# Patient Record
Sex: MALE | Race: BLACK OR AFRICAN AMERICAN | NOT HISPANIC OR LATINO | Employment: PART TIME | URBAN - METROPOLITAN AREA
[De-identification: names, ages, dates, MRNs, and addresses within clinical notes are randomized per-mention and may not be internally consistent; named-entity substitution may affect disease eponyms.]

---

## 2019-12-25 ENCOUNTER — HOSPITAL ENCOUNTER (EMERGENCY)
Facility: HOSPITAL | Age: 29
Discharge: HOME | End: 2019-12-25
Attending: EMERGENCY MEDICINE

## 2019-12-25 VITALS
BODY MASS INDEX: 34.07 KG/M2 | TEMPERATURE: 98 F | HEART RATE: 72 BPM | OXYGEN SATURATION: 99 % | SYSTOLIC BLOOD PRESSURE: 137 MMHG | RESPIRATION RATE: 18 BRPM | DIASTOLIC BLOOD PRESSURE: 82 MMHG | WEIGHT: 230 LBS | HEIGHT: 69 IN

## 2019-12-25 DIAGNOSIS — R09.89 ABNORMAL LUNG SOUNDS: ICD-10-CM

## 2019-12-25 DIAGNOSIS — J01.40 ACUTE NON-RECURRENT PANSINUSITIS: Primary | ICD-10-CM

## 2019-12-25 LAB
ANION GAP SERPL CALC-SCNC: 14 MMOL/L (ref 8–16)
BASOPHILS # BLD AUTO: 0.02 K/UL (ref 0–0.2)
BASOPHILS NFR BLD: 0.5 % (ref 0–1.9)
BUN SERPL-MCNC: 11 MG/DL (ref 6–30)
CHLORIDE SERPL-SCNC: 103 MMOL/L (ref 95–110)
CREAT SERPL-MCNC: 1 MG/DL (ref 0.5–1.4)
CTP QC/QA: YES
DIFFERENTIAL METHOD: ABNORMAL
EOSINOPHIL # BLD AUTO: 0.1 K/UL (ref 0–0.5)
EOSINOPHIL NFR BLD: 1.8 % (ref 0–8)
ERYTHROCYTE [DISTWIDTH] IN BLOOD BY AUTOMATED COUNT: 12.3 % (ref 11.5–14.5)
GLUCOSE SERPL-MCNC: 97 MG/DL (ref 70–110)
HCT VFR BLD AUTO: 46.7 % (ref 40–54)
HCT VFR BLD CALC: 47 %PCV (ref 36–54)
HGB BLD-MCNC: 15.1 G/DL (ref 14–18)
IMM GRANULOCYTES # BLD AUTO: 0.01 K/UL (ref 0–0.04)
IMM GRANULOCYTES NFR BLD AUTO: 0.3 % (ref 0–0.5)
LYMPHOCYTES # BLD AUTO: 2 K/UL (ref 1–4.8)
LYMPHOCYTES NFR BLD: 52.6 % (ref 18–48)
MCH RBC QN AUTO: 28.3 PG (ref 27–31)
MCHC RBC AUTO-ENTMCNC: 32.3 G/DL (ref 32–36)
MCV RBC AUTO: 88 FL (ref 82–98)
MONOCYTES # BLD AUTO: 0.3 K/UL (ref 0.3–1)
MONOCYTES NFR BLD: 8.7 % (ref 4–15)
NEUTROPHILS # BLD AUTO: 1.4 K/UL (ref 1.8–7.7)
NEUTROPHILS NFR BLD: 36.1 % (ref 38–73)
NRBC BLD-RTO: 0 /100 WBC
PLATELET # BLD AUTO: 198 K/UL (ref 150–350)
PMV BLD AUTO: 11.1 FL (ref 9.2–12.9)
POC IONIZED CALCIUM: 1.22 MMOL/L (ref 1.06–1.42)
POC MOLECULAR INFLUENZA A AGN: NEGATIVE
POC MOLECULAR INFLUENZA B AGN: NEGATIVE
POC TCO2 (MEASURED): 27 MMOL/L (ref 23–29)
POTASSIUM BLD-SCNC: 4.3 MMOL/L (ref 3.5–5.1)
RBC # BLD AUTO: 5.33 M/UL (ref 4.6–6.2)
SAMPLE: NORMAL
SODIUM BLD-SCNC: 139 MMOL/L (ref 136–145)
WBC # BLD AUTO: 3.8 K/UL (ref 3.9–12.7)

## 2019-12-25 PROCEDURE — 84132 ASSAY OF SERUM POTASSIUM: CPT

## 2019-12-25 PROCEDURE — 96361 HYDRATE IV INFUSION ADD-ON: CPT

## 2019-12-25 PROCEDURE — 82330 ASSAY OF CALCIUM: CPT

## 2019-12-25 PROCEDURE — 85014 HEMATOCRIT: CPT

## 2019-12-25 PROCEDURE — 85025 COMPLETE CBC W/AUTO DIFF WBC: CPT

## 2019-12-25 PROCEDURE — 84295 ASSAY OF SERUM SODIUM: CPT

## 2019-12-25 PROCEDURE — 87502 INFLUENZA DNA AMP PROBE: CPT

## 2019-12-25 PROCEDURE — 96374 THER/PROPH/DIAG INJ IV PUSH: CPT

## 2019-12-25 PROCEDURE — 99285 EMERGENCY DEPT VISIT HI MDM: CPT | Mod: 25

## 2019-12-25 PROCEDURE — 82565 ASSAY OF CREATININE: CPT

## 2019-12-25 PROCEDURE — 99900035 HC TECH TIME PER 15 MIN (STAT)

## 2019-12-25 PROCEDURE — 63600175 PHARM REV CODE 636 W HCPCS: Performed by: NURSE PRACTITIONER

## 2019-12-25 PROCEDURE — 25500020 PHARM REV CODE 255: Performed by: EMERGENCY MEDICINE

## 2019-12-25 RX ORDER — AMOXICILLIN AND CLAVULANATE POTASSIUM 400; 57 MG/5ML; MG/5ML
875 POWDER, FOR SUSPENSION ORAL 2 TIMES DAILY
Qty: 153 ML | Refills: 0 | Status: SHIPPED | OUTPATIENT
Start: 2019-12-25 | End: 2019-12-25 | Stop reason: ALTCHOICE

## 2019-12-25 RX ORDER — KETOROLAC TROMETHAMINE 30 MG/ML
15 INJECTION, SOLUTION INTRAMUSCULAR; INTRAVENOUS
Status: COMPLETED | OUTPATIENT
Start: 2019-12-25 | End: 2019-12-25

## 2019-12-25 RX ORDER — AMOXICILLIN AND CLAVULANATE POTASSIUM 875; 125 MG/1; MG/1
1 TABLET, FILM COATED ORAL 2 TIMES DAILY
Qty: 14 TABLET | Refills: 0 | Status: SHIPPED | OUTPATIENT
Start: 2019-12-25 | End: 2019-12-25 | Stop reason: ALTCHOICE

## 2019-12-25 RX ADMIN — SODIUM CHLORIDE 1000 ML: 0.9 INJECTION, SOLUTION INTRAVENOUS at 10:12

## 2019-12-25 RX ADMIN — KETOROLAC TROMETHAMINE 15 MG: 30 INJECTION, SOLUTION INTRAMUSCULAR at 10:12

## 2019-12-25 RX ADMIN — IOHEXOL 100 ML: 350 INJECTION, SOLUTION INTRAVENOUS at 11:12

## 2019-12-25 NOTE — DISCHARGE INSTRUCTIONS
Alternate Tylenol and advil every 3 hours for fever/body aches.       Flonase for congestion and runny nose.       Return to the Emergency department for any worsening or failure to improve, otherwise follow up with your primary care provider.

## 2019-12-25 NOTE — ED PROVIDER NOTES
"Encounter Date: 12/25/2019    SCRIBE #1 NOTE: I, Lindsay Romano, am scribing for, and in the presence of,  Anurag Ventura DNP. I have scribed the following portions of the note - Other sections scribed: HPI, ROS, PE.       History     Chief Complaint   Patient presents with    Dental Pain     x2 weeks of dental pain recently radiating to L ear with sore throat. some OTC meds taken with no relief in pain.      CC: Dental Pain    HPI:  This is a 29 y.o. male who presents to the Emergency Department with a cc of left sided dental pain beginning 3 weeks ago. The patient states "My pain has been worsening in the past week but it was the worst last night". He rates his pain 6/10. His pain is radiating to his left ear and head. The patient reports associated headache, sore throat, congestion, cough, sneezing, and fever of 98.9F. He states that his coughing and sneezing resolved this week. Denies chills, diarrhea, nausea, vomiting, or any other associated symptoms. The patient reports taking acetaminophen with no relief.      The history is provided by the patient.     Review of patient's allergies indicates:  No Known Allergies  History reviewed. No pertinent past medical history.  History reviewed. No pertinent surgical history.  History reviewed. No pertinent family history.  Social History     Tobacco Use    Smoking status: Never Smoker   Substance Use Topics    Alcohol use: Yes     Comment: occ    Drug use: No     Review of Systems   Constitutional: Positive for fever. Negative for chills.   HENT: Positive for congestion, dental problem (left sided dental pain), ear pain (left), sneezing and sore throat.    Eyes: Negative for visual disturbance.   Respiratory: Positive for cough. Negative for shortness of breath.    Cardiovascular: Negative for chest pain.   Gastrointestinal: Negative for abdominal pain, diarrhea, nausea and vomiting.   Genitourinary: Negative for dysuria.   Skin: Negative for rash. "   Allergic/Immunologic: Negative for immunocompromised state.   Neurological: Positive for headaches.       Physical Exam     Initial Vitals [12/25/19 0910]   BP Pulse Resp Temp SpO2   (!) 166/89 84 18 98.9 °F (37.2 °C) 97 %      MAP       --         Physical Exam    Nursing note and vitals reviewed.  Constitutional: He appears well-developed and well-nourished. He is not diaphoretic. No distress.   HENT:   Head: Normocephalic and atraumatic.   Right Ear: Hearing, tympanic membrane, external ear and ear canal normal.   Left Ear: Hearing, tympanic membrane, external ear and ear canal normal.   Nose: Nose normal. No mucosal edema or rhinorrhea. No epistaxis. Right sinus exhibits no maxillary sinus tenderness and no frontal sinus tenderness. Left sinus exhibits no maxillary sinus tenderness and no frontal sinus tenderness.   Mouth/Throat: Uvula is midline, oropharynx is clear and moist and mucous membranes are normal. No oral lesions. Normal dentition. No oropharyngeal exudate.   No tenderness or erythema to gums.    Eyes: Conjunctivae and EOM are normal. Pupils are equal, round, and reactive to light.   Neck: Normal range of motion. Neck supple.   Cardiovascular: Normal rate, regular rhythm, normal heart sounds and intact distal pulses.   Pulmonary/Chest: He has rales in the left upper field.   Abdominal: Soft. Normal appearance and bowel sounds are normal. There is no tenderness. There is no rebound and no guarding.   Musculoskeletal: Normal range of motion. He exhibits no edema or tenderness.   Neurological: He is alert and oriented to person, place, and time. He has normal strength. No cranial nerve deficit or sensory deficit. He exhibits normal muscle tone. He displays a negative Romberg sign. Coordination and gait normal. GCS score is 15. GCS eye subscore is 4. GCS verbal subscore is 5. GCS motor subscore is 6.   Equal  strength bilaterally, equal bicep flexion and tricep extension strength, leg extension and  flexion strength appropriate and equal, foot plantar- and dorsi-flexion equal and appropriate   Skin: Skin is warm and dry.   Psychiatric: He has a normal mood and affect. Thought content normal.         ED Course   Procedures  Labs Reviewed   CBC W/ AUTO DIFFERENTIAL - Abnormal; Notable for the following components:       Result Value    WBC 3.80 (*)     Gran # (ANC) 1.4 (*)     Gran% 36.1 (*)     Lymph% 52.6 (*)     All other components within normal limits   POCT INFLUENZA A/B MOLECULAR   ISTAT PROCEDURE          Imaging Results          CT Maxillofacial With Contrast (Final result)  Result time 12/25/19 11:35:17    Final result by Trace Marr MD (12/25/19 11:35:17)                 Impression:      No abscess or drainable fluid collections.  No evidence to suggest osteomyelitis.  Bilateral maxillary sinusitis, and other findings as outlined above      Electronically signed by: Trace Marr MD  Date:    12/25/2019  Time:    11:35             Narrative:    EXAMINATION:  CT MAXILLOFACIAL WITH CONTRAST    CLINICAL HISTORY:  Pain, maxface;with fever;    TECHNIQUE:  Maxillofacial CT was performed without IV contrast.  Sagittal and coronal reformats were obtained via post processing.    COMPARISON:  None    FINDINGS:  Visualized portions of the brain parenchyma appear normal.  Orbits are symmetric in appearance.  Bilateral chronic maxillary sinusitis changes are noted.  Mastoid air cells are normally pneumatized.  Skull is intact.  Visualized upper cervical spine appears normal.    Nasopharynx and oropharynx appear normal.  Epiglottis is unremarkable.  The parapharyngeal and retro pharyngeal fat planes are preserved.  Small scattered shotty bilateral cervical lymph nodes.  The visualized submandibular and parotid glands are within normal limits.  Visualized vessels of the neck remain grossly patent.  Note is made of periapical lucency surrounding posterior right upper molars.  No significant.  Occult lucency on the  left.  Please correlate with exam findings.  No abscess or drainable fluid collection.  No CT evidence suggesting osteomyelitis.  Deep to the BB marker on the left side of the face, no significant abnormality is seen.                               X-Ray Chest PA And Lateral (Final result)  Result time 12/25/19 10:08:57    Final result by Trace Marr MD (12/25/19 10:08:57)                 Impression:      No acute findings      Electronically signed by: Trace Marr MD  Date:    12/25/2019  Time:    10:08             Narrative:    EXAMINATION:  XR CHEST PA AND LATERAL    CLINICAL HISTORY:  Other specified symptoms and signs involving the circulatory and respiratory systems    TECHNIQUE:  PA and lateral views of the chest were performed.    COMPARISON:  None    FINDINGS:  Clear lungs.  No effusion.  Normal cardiomediastinal silhouette.  Intact bones.                                 Medical Decision Making:   Initial Assessment:   29-year-old male presents with dental pain to the left upper and lower mouth.  On physical exam there is no sinus tenderness, no nasal discharge, but erythema is present.  The gingiva is not reddened and there is no drainable collection of purulence.  Floor of the mouth is soft and supple. There is no adenopathy to suggest a causative etiology for the patient's discomfort.  Differential Diagnosis:   Sinusitis, pulpitis, dental caries, adenopathy, trigeminal neuralgia  ED Management:  Initial orders include CBC, i-STAT Chem 8, p.o. CT influenza, IV placement, CT maxillofacial with contrast, CT chest x-ray, Toradol 15 mg IV push, saline 1 L IV.            Scribe Attestation:   Scribe #1: I performed the above scribed service and the documentation accurately describes the services I performed. I attest to the accuracy of the note.            ED Course as of Dec 25 2005   Wed Dec 25, 2019   1038 Essentially normal istat chem8     ISTAT PROCEDURE [VC]   1038 POC Molecular Influenza A Ag:  Negative [VC]   1039 POC Molecular Influenza B Ag: Negative [VC]   1039 No acute findings   X-Ray Chest PA And Lateral [VC]   1044 CBC: leukocyte count was normal, the H&H was normal. The platelet count was normal.        CBC auto differential(!) [VC]   1141 No abscess or drainable fluid collections.  No evidence to suggest osteomyelitis.  Bilateral maxillary sinusitis, and other findings as outlined above   CT Maxillofacial With Contrast [VC]      ED Course User Index  [VC] Anurag Ventura DNP     Patient is discharged home in good condition to follow up with primary care.  Prescription for Augmentin is provided.  Patient should return for any worsening or changes in condition otherwise follow up as directed.  Symptomatic therapies and return precautions on AVS.   Medication choices were made after reviewing allergies, medications, history, available laboratories.            Clinical Impression:       ICD-10-CM ICD-9-CM   1. Acute non-recurrent pansinusitis J01.40 461.8   2. Abnormal lung sounds R09.89 786.7         Disposition:   Disposition: Discharged  Condition: Stable    Scribe attestation: SIMONE CRUZ DNP ACNP-BC FNP-C, personally performed the services described in this documentation. All medical record entries made by the scribe were at my direction and in my presence. I have reviewed the chart and agree that the record reflects my personal performance and is accurate and complete                     Anurag Ventura DNP  12/25/19 2005

## 2019-12-25 NOTE — ED TRIAGE NOTES
Pt c/o LEFT-sided dental pain x3 weeks. Denies fever, drainage, N/V. Also c/o LEFT ear pain, LEFT-sided throat pain. Pain is 5/10. Pt took acetaminophen last at 9:45 PM

## 2023-07-14 PROCEDURE — 99282 EMERGENCY DEPT VISIT SF MDM: CPT

## 2023-07-15 ENCOUNTER — HOSPITAL ENCOUNTER (EMERGENCY)
Facility: HOSPITAL | Age: 33
Discharge: HOME | End: 2023-07-15
Attending: EMERGENCY MEDICINE

## 2023-07-15 VITALS
HEART RATE: 71 BPM | OXYGEN SATURATION: 99 % | TEMPERATURE: 99 F | RESPIRATION RATE: 20 BRPM | DIASTOLIC BLOOD PRESSURE: 117 MMHG | SYSTOLIC BLOOD PRESSURE: 153 MMHG

## 2023-07-15 DIAGNOSIS — I10 HYPERTENSION, UNSPECIFIED TYPE: ICD-10-CM

## 2023-07-15 DIAGNOSIS — H61.20 IMPACTED CERUMEN, UNSPECIFIED LATERALITY: Primary | ICD-10-CM

## 2023-07-15 NOTE — ED NOTES
Patient arrived to the ED complaining of ear fullness, has been having sinus pressure for the last 3 days. Patient decided to come in to the ED because over the counter ear drops were not able to help with his ear fullness.Patient denies SOB or fever. Alert x 4. Will continue to monitor.

## 2023-07-15 NOTE — ED PROVIDER NOTES
Encounter Date: 7/14/2023       History     Chief Complaint   Patient presents with    Ear Fullness     Reports sinus pressure and ear muffledness x 3 days. Trying otc drops and this isn't helping. Sore throat yesterday but not today. No fever, cp, sob.     Jason Sandoval is a 32 y.o. male presenting to [facility] ED for ear fullness.  Patient states that he has been experiencing sinus pressure for the last 3 days.  States that he has a feeling of ear fullness and decreased hearing in his bilateral ears.  Tried out over-the-counter drops without significant relief.  Denies any pain in his ears.      Review of patient's allergies indicates:  No Known Allergies  History reviewed. No pertinent past medical history.  History reviewed. No pertinent surgical history.  History reviewed. No pertinent family history.  Social History     Tobacco Use    Smoking status: Never   Substance Use Topics    Alcohol use: Yes     Comment: occ    Drug use: No     Review of Systems   Constitutional:  Negative for fever.   HENT:  Positive for congestion and sore throat. Negative for ear pain and rhinorrhea.    Eyes:  Negative for visual disturbance.   Respiratory:  Negative for cough and shortness of breath.    Cardiovascular:  Negative for chest pain and leg swelling.   Gastrointestinal:  Negative for abdominal pain, diarrhea, nausea and vomiting.   Genitourinary:  Negative for dysuria and hematuria.   Neurological:  Negative for weakness.     Physical Exam     Initial Vitals [07/14/23 2229]   BP Pulse Resp Temp SpO2   (!) 165/107 75 16 98.7 °F (37.1 °C) 98 %      MAP       --         Physical Exam    Nursing note and vitals reviewed.  Constitutional: He appears well-developed and well-nourished. He is cooperative.  Non-toxic appearance. He does not appear ill.   HENT:   Head: Normocephalic and atraumatic.   Ears:    Mouth/Throat: Mucous membranes are normal. Mucous membranes are not dry.   Eyes: Conjunctivae are normal. Pupils are equal, round,  and reactive to light.   Neck: Trachea normal and phonation normal.   Cardiovascular:  Normal rate, regular rhythm, normal heart sounds, intact distal pulses and normal pulses.     Exam reveals no gallop, no S3, no S4 and no friction rub.       No murmur heard.  Pulmonary/Chest: Breath sounds normal. No respiratory distress. He has no wheezes. He has no rhonchi. He has no rales.   Abdominal: Abdomen is soft. He exhibits no distension. There is no abdominal tenderness. There is no rebound.   Musculoskeletal:      Right lower leg: No edema.      Left lower leg: No edema.     Neurological: He is alert.   Skin: Skin is warm, dry and intact. Capillary refill takes less than 2 seconds.   Psychiatric: He has a normal mood and affect. His speech is normal.       ED Course   Procedures  Labs Reviewed   HIV 1 / 2 ANTIBODY   HEPATITIS C ANTIBODY          Imaging Results    None          Medications - No data to display  Medical Decision Making:   Initial Assessment:   Previously healthy 33-year-old male presenting for ear fullness and congestion.  Initially, patient hypertensive but overall hemodynamically stable.  Differential Diagnosis:   Impacted cerumen, otitis media, viral illness  ED Management:  Patient presents impacted cerumen in bilateral ear canals.  Unable to assess tympanic membranes for otitis media.  No signs of otitis externa, mastoiditis.    Attempted to irrigate ear canals multiple times.  Able to visualize left TM, no erythema, no bulging TM, no purulence noted.  Left TM is not consistent with otitis media.  After multiple rounds of irrigation, unable to dislodge impacted cerumen of the right ear.  Since patient does not endorse pain, short duration of congestion, low suspicion for otitis media of the right ear.  Conducted shared decision-making with patient.  Will discharge patient with Debrox drops.  Discussed using the drops and following up with primary care physician so that repeat irrigation and  visualization of the tympanic membrane may be conducted.  If patient experiences pain of his right ear, encouraged him to return to emergency department or urgent care for treatment for otitis media.  Patient is agreeable and happy with this plan.      Patient is hemodynamically stable, nontoxic appearing, he is appropriate for discharge at this time.  Encouraged close follow-up with primary doctor as well as discussed return precautions .          Attending Attestation:   Physician Attestation Statement for Resident:  As the supervising MD   Physician Attestation Statement: I have personally seen and examined this patient.   I agree with the above history.  -:   As the supervising MD I agree with the above PE.     As the supervising MD I agree with the above treatment, course, plan, and disposition.                               Clinical Impression:   Final diagnoses:  [H61.20] Impacted cerumen, unspecified laterality (Primary)  [I10] Hypertension, unspecified type        ED Disposition Condition    Discharge Stable          ED Prescriptions       Medication Sig Dispense Start Date End Date Auth. Provider    carbamide peroxide (DEBROX) 6.5 % otic solution Place 5 drops into both ears 2 (two) times daily. 15 mL 7/15/2023 -- Angela Devine MD          Follow-up Information       Follow up With Specialties Details Why Contact Info    Your primary care doctor  Schedule an appointment as soon as possible for a visit                Angela Devine MD  Resident  07/15/23 [id number]       Magalis Webb MD  07/15/23 [id number]

## 2023-07-15 NOTE — DISCHARGE INSTRUCTIONS
Your blood pressure was elevated in the emergency department.  You must follow-up with your primary care doctor for adjustment of your medicine.    Follow-Up Plan:  - Follow-up with primary care doctor within 3 - 5 days  - Additional testing and/or evaluation as directed by your primary doctor    Return to the Emergency Department for symptoms including but not limited to: worsening symptoms, shortness of breath or chest pain, vomiting with inability to hold down fluids, fevers greater than 100.4°F, passing out/fainting/unconsciousness, or other concerning symptoms.

## 2025-04-25 NOTE — ED NOTES
"Resp notified of ISTAT CHEM8. Tech states "I'm going to be a minute"  "
Bed: 40D INFUSION  Expected date:   Expected time:   Means of arrival:   Comments:  RM 33  
5